# Patient Record
Sex: FEMALE | Race: WHITE | NOT HISPANIC OR LATINO | URBAN - METROPOLITAN AREA
[De-identification: names, ages, dates, MRNs, and addresses within clinical notes are randomized per-mention and may not be internally consistent; named-entity substitution may affect disease eponyms.]

---

## 2020-10-13 ENCOUNTER — NEW PATIENT (OUTPATIENT)
Dept: URBAN - METROPOLITAN AREA CLINIC 44 | Facility: CLINIC | Age: 70
End: 2020-10-13
Payer: MEDICARE

## 2020-10-13 DIAGNOSIS — H43.821 VITREOMACULAR ADHESION, RIGHT EYE: ICD-10-CM

## 2020-10-13 DIAGNOSIS — H26.493 OTHER SECONDARY CATARACT, BILATERAL: Primary | ICD-10-CM

## 2020-10-13 PROCEDURE — 92004 COMPRE OPH EXAM NEW PT 1/>: CPT | Performed by: OPTOMETRIST

## 2020-10-13 PROCEDURE — 92134 CPTRZ OPH DX IMG PST SGM RTA: CPT | Performed by: OPTOMETRIST

## 2020-10-13 ASSESSMENT — VISUAL ACUITY
OD: 20/40
OS: 20/25

## 2020-10-13 ASSESSMENT — INTRAOCULAR PRESSURE
OD: 16
OS: 16

## 2020-10-13 ASSESSMENT — KERATOMETRY
OS: 44.38
OD: 44.88

## 2022-10-26 ENCOUNTER — OFFICE VISIT (OUTPATIENT)
Dept: URBAN - METROPOLITAN AREA CLINIC 44 | Facility: CLINIC | Age: 72
End: 2022-10-26
Payer: MEDICARE

## 2022-10-26 DIAGNOSIS — H26.499 OTHER SECONDARY CATARACT OF EYE: Primary | ICD-10-CM

## 2022-10-26 DIAGNOSIS — H04.123 TEAR FILM INSUFFICIENCY OF BILATERAL LACRIMAL GLANDS: ICD-10-CM

## 2022-10-26 DIAGNOSIS — H43.813 VITREOUS DEGENERATION, BILATERAL: ICD-10-CM

## 2022-10-26 DIAGNOSIS — H35.372 PUCKERING OF MACULA, LEFT EYE: ICD-10-CM

## 2022-10-26 PROCEDURE — 92134 CPTRZ OPH DX IMG PST SGM RTA: CPT | Performed by: OPTOMETRIST

## 2022-10-26 PROCEDURE — 99214 OFFICE O/P EST MOD 30 MIN: CPT | Performed by: OPTOMETRIST

## 2022-10-26 ASSESSMENT — INTRAOCULAR PRESSURE
OS: 16
OD: 16

## 2022-10-26 ASSESSMENT — VISUAL ACUITY
OD: 20/40
OS: 20/30

## 2022-10-26 ASSESSMENT — KERATOMETRY
OD: 44.88
OS: 44.50

## 2022-10-26 NOTE — IMPRESSION/PLAN
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 Visually significant/symptomatic PCO. BCVA OD 20/40, OS 20/30. Glare OD 20/80, OS 20/70. Plan: PLAN: Discussed findings. REC YAG to improve vision. Patient wishes to proceed. RTC for YAG OD then OS.  Patient wishes to speak to her insurance first and then will call to schedule time with [facility]

## 2023-04-11 ENCOUNTER — OFFICE VISIT (OUTPATIENT)
Dept: URBAN - METROPOLITAN AREA CLINIC 44 | Facility: CLINIC | Age: 73
End: 2023-04-11
Payer: MEDICARE

## 2023-04-11 DIAGNOSIS — H43.813 VITREOUS DEGENERATION, BILATERAL: ICD-10-CM

## 2023-04-11 DIAGNOSIS — H26.493 OTHER SECONDARY CATARACT, BILATERAL: Primary | ICD-10-CM

## 2023-04-11 DIAGNOSIS — H35.373 PUCKERING OF MACULA, BILATERAL: ICD-10-CM

## 2023-04-11 DIAGNOSIS — H04.123 TEAR FILM INSUFFICIENCY OF BILATERAL LACRIMAL GLANDS: ICD-10-CM

## 2023-04-11 PROCEDURE — 99214 OFFICE O/P EST MOD 30 MIN: CPT | Performed by: OPTOMETRIST

## 2023-04-11 PROCEDURE — 92134 CPTRZ OPH DX IMG PST SGM RTA: CPT | Performed by: OPTOMETRIST

## 2023-04-11 ASSESSMENT — KERATOMETRY
OD: 44.63
OS: 44.50

## 2023-04-11 ASSESSMENT — INTRAOCULAR PRESSURE
OD: 17
OS: 18

## 2023-04-11 ASSESSMENT — VISUAL ACUITY
OS: 20/30
OD: 20/30

## 2023-04-11 NOTE — IMPRESSION/PLAN
Impression: Other secondary cataract, bilateral: H26.493. Visually significant/symptomatic PCO. BCVA OD 20/30, OS 20/30. Glare OD 20/70, OS 20/70. Plan: PLAN: Discussed findings. REC YAG to improve vision. Patient wishes to proceed. RTC for YAG OS then OD.

## 2023-04-11 NOTE — IMPRESSION/PLAN
Impression: Puckering of macula, bilateral: H35.373. Irregular macular contour noted per OCT and exam. No ME noted. Patient has no significant visual complaints at this time. Plan: Discussed findings. Observe for now. RTC 12 months for complete + OCT(Mac) and sooner if changes in vision are observed.